# Patient Record
Sex: FEMALE | Race: BLACK OR AFRICAN AMERICAN | NOT HISPANIC OR LATINO | URBAN - METROPOLITAN AREA
[De-identification: names, ages, dates, MRNs, and addresses within clinical notes are randomized per-mention and may not be internally consistent; named-entity substitution may affect disease eponyms.]

---

## 2024-02-02 ENCOUNTER — HOSPITAL ENCOUNTER (EMERGENCY)
Facility: HOSPITAL | Age: 24
Discharge: HOME | End: 2024-02-02
Payer: COMMERCIAL

## 2024-02-02 ENCOUNTER — DOCUMENTATION (OUTPATIENT)
Dept: EMERGENCY MEDICINE | Facility: HOSPITAL | Age: 24
End: 2024-02-02
Payer: COMMERCIAL

## 2024-02-02 VITALS
WEIGHT: 220.02 LBS | HEIGHT: 66 IN | TEMPERATURE: 98.1 F | OXYGEN SATURATION: 97 % | DIASTOLIC BLOOD PRESSURE: 79 MMHG | BODY MASS INDEX: 35.36 KG/M2 | HEART RATE: 99 BPM | SYSTOLIC BLOOD PRESSURE: 128 MMHG | RESPIRATION RATE: 20 BRPM

## 2024-02-02 DIAGNOSIS — N89.8 VAGINAL DISCHARGE: Primary | ICD-10-CM

## 2024-02-02 LAB
APPEARANCE UR: CLEAR
BILIRUB UR STRIP.AUTO-MCNC: NEGATIVE MG/DL
C TRACH RRNA SPEC QL NAA+PROBE: NEGATIVE
CLUE CELLS SPEC QL WET PREP: NORMAL
COLOR UR: COLORLESS
GLUCOSE UR STRIP.AUTO-MCNC: NEGATIVE MG/DL
HCV AB SER QL: NONREACTIVE
HIV 1+2 AB+HIV1 P24 AG SERPL QL IA: NONREACTIVE
HOLD SPECIMEN: NORMAL
KETONES UR STRIP.AUTO-MCNC: NEGATIVE MG/DL
LEUKOCYTE ESTERASE UR QL STRIP.AUTO: NEGATIVE
N GONORRHOEA DNA SPEC QL PROBE+SIG AMP: NEGATIVE
NITRITE UR QL STRIP.AUTO: NEGATIVE
PH UR STRIP.AUTO: 6 [PH]
PREGNANCY TEST URINE, POC: NEGATIVE
PROT UR STRIP.AUTO-MCNC: NEGATIVE MG/DL
RBC # UR STRIP.AUTO: NEGATIVE /UL
SP GR UR STRIP.AUTO: 1
T VAGINALIS SPEC QL WET PREP: NORMAL
TREPONEMA PALLIDUM IGG+IGM AB [PRESENCE] IN SERUM OR PLASMA BY IMMUNOASSAY: NONREACTIVE
UROBILINOGEN UR STRIP.AUTO-MCNC: <2 MG/DL
WBC VAG QL WET PREP: NORMAL
YEAST VAG QL WET PREP: NORMAL

## 2024-02-02 PROCEDURE — 99284 EMERGENCY DEPT VISIT MOD MDM: CPT

## 2024-02-02 PROCEDURE — 99283 EMERGENCY DEPT VISIT LOW MDM: CPT

## 2024-02-02 PROCEDURE — 81003 URINALYSIS AUTO W/O SCOPE: CPT | Performed by: PHYSICIAN ASSISTANT

## 2024-02-02 PROCEDURE — 81025 URINE PREGNANCY TEST: CPT | Performed by: PHYSICIAN ASSISTANT

## 2024-02-02 PROCEDURE — 86780 TREPONEMA PALLIDUM: CPT | Performed by: PHYSICIAN ASSISTANT

## 2024-02-02 PROCEDURE — 87210 SMEAR WET MOUNT SALINE/INK: CPT | Performed by: PHYSICIAN ASSISTANT

## 2024-02-02 PROCEDURE — 86803 HEPATITIS C AB TEST: CPT | Performed by: PHYSICIAN ASSISTANT

## 2024-02-02 PROCEDURE — 87389 HIV-1 AG W/HIV-1&-2 AB AG IA: CPT | Performed by: PHYSICIAN ASSISTANT

## 2024-02-02 PROCEDURE — 87800 DETECT AGNT MULT DNA DIREC: CPT | Performed by: PHYSICIAN ASSISTANT

## 2024-02-02 PROCEDURE — 36415 COLL VENOUS BLD VENIPUNCTURE: CPT | Performed by: PHYSICIAN ASSISTANT

## 2024-02-02 PROCEDURE — 99284 EMERGENCY DEPT VISIT MOD MDM: CPT | Performed by: PHYSICIAN ASSISTANT

## 2024-02-02 RX ORDER — BUSPIRONE HYDROCHLORIDE 5 MG/1
1 TABLET ORAL 3 TIMES DAILY
COMMUNITY
Start: 2024-01-24 | End: 2024-02-23

## 2024-02-02 RX ORDER — OLANZAPINE 10 MG/1
TABLET ORAL
COMMUNITY
Start: 2024-01-24 | End: 2024-02-23

## 2024-02-02 RX ORDER — HYDROXYZINE PAMOATE 25 MG/1
CAPSULE ORAL 3 TIMES DAILY PRN
COMMUNITY
Start: 2024-01-24 | End: 2024-02-23

## 2024-02-02 ASSESSMENT — LIFESTYLE VARIABLES
EVER HAD A DRINK FIRST THING IN THE MORNING TO STEADY YOUR NERVES TO GET RID OF A HANGOVER: NO
EVER FELT BAD OR GUILTY ABOUT YOUR DRINKING: NO
HAVE PEOPLE ANNOYED YOU BY CRITICIZING YOUR DRINKING: NO
HAVE YOU EVER FELT YOU SHOULD CUT DOWN ON YOUR DRINKING: NO

## 2024-02-02 ASSESSMENT — PAIN DESCRIPTION - PROGRESSION: CLINICAL_PROGRESSION: NOT CHANGED

## 2024-02-02 ASSESSMENT — COLUMBIA-SUICIDE SEVERITY RATING SCALE - C-SSRS
2. HAVE YOU ACTUALLY HAD ANY THOUGHTS OF KILLING YOURSELF?: NO
1. IN THE PAST MONTH, HAVE YOU WISHED YOU WERE DEAD OR WISHED YOU COULD GO TO SLEEP AND NOT WAKE UP?: NO
6. HAVE YOU EVER DONE ANYTHING, STARTED TO DO ANYTHING, OR PREPARED TO DO ANYTHING TO END YOUR LIFE?: NO

## 2024-02-02 ASSESSMENT — PAIN - FUNCTIONAL ASSESSMENT: PAIN_FUNCTIONAL_ASSESSMENT: 0-10

## 2024-02-02 NOTE — ED TRIAGE NOTES
Pt  just got out of senior care in end of December and started noticing white creamy discharge and an odor (fishy).  was treated at  for yeast infection but pills did not help.

## 2024-02-02 NOTE — ED PROVIDER NOTES
HPI   Chief Complaint   Patient presents with    Exposure to STD       Patient is a 23-year-old female who is previously healthy who just got out of [facility] about 4 weeks ago and for the last 3 weeks has been having vaginal discharge, she states it is thick white and creamy and has a slight smell to it.  Patient states urgent care tested her a couple days ago but they never called her with her results.  She states they prescribed her Diflucan which did not help.  Patient believes that may be BV, she is agreeable to getting all tested for STDs including blood testing for HIV syphilis and hepatitis C.  Denies any dyspareunia, she states her pregnancy test at urgent care couple days ago that was negative.  No abdominal pain or flank pain.  She does endorse urinary frequency without burning or urgency.                          [place] Coma Scale Score: 15                  Patient History   Past Medical History:   Diagnosis Date    Bipolar 1 disorder (CMS/HCC)     OCD (obsessive compulsive disorder)     Schizoaffective disorder (CMS/HCC)      Past Surgical History:   Procedure Laterality Date    OTHER SURGICAL HISTORY  10/07/2016    Prior Surgical Procedure Not Done     No family history on file.  Social History     Tobacco Use    Smoking status: Every Day     Types: Cigarettes    Smokeless tobacco: Never   Substance Use Topics    Alcohol use: Not Currently    Drug use: Not Currently       Physical Exam   ED Triage Vitals [02/02/24 0833]   Temperature Heart Rate Respirations BP   36.7 °C (98.1 °F) 99 20 128/79      Pulse Ox Temp Source Heart Rate Source Patient Position   97 % Oral Monitor Sitting      BP Location FiO2 (%)     -- 21 %       Physical Exam  Vitals and nursing note reviewed.   Constitutional:       General: She is not in acute distress.     Appearance: Normal appearance. She is not toxic-appearing.   HENT:      Head: Normocephalic and atraumatic.      Nose: Nose normal.   Eyes:      Extraocular Movements:  Extraocular movements intact.   Cardiovascular:      Rate and Rhythm: Normal rate and regular rhythm.   Pulmonary:      Effort: Pulmonary effort is normal.   Abdominal:      Palpations: Abdomen is soft.   Genitourinary:     Comments: Minimal white discharge adherent to vaginal walls, not copious, no pain.  Chaperoned by Bee Schuster, female PA.  Musculoskeletal:         General: Normal range of motion.      Cervical back: Normal range of motion and neck supple.   Skin:     General: Skin is warm and dry.   Neurological:      General: No focal deficit present.      Mental Status: She is alert.   Psychiatric:         Mood and Affect: Mood normal.         Thought Content: Thought content normal.       No orders to display     Labs Reviewed   URINALYSIS WITH REFLEX CULTURE AND MICROSCOPIC - Abnormal       Result Value    Color, Urine Colorless (*)     Appearance, Urine Clear      Specific Gravity, Urine 1.002 (*)     pH, Urine 6.0      Protein, Urine NEGATIVE      Glucose, Urine NEGATIVE      Blood, Urine NEGATIVE      Ketones, Urine NEGATIVE      Bilirubin, Urine NEGATIVE      Urobilinogen, Urine <2.0      Nitrite, Urine NEGATIVE      Leukocyte Esterase, Urine NEGATIVE     POCT PREGNANCY, URINE - Normal    Preg Test, Ur Negative     WET PREP, GENITAL    Trichomonas None Seen      Clue Cells None Seen      Yeast None Seen      WBC 3-8     C. TRACHOMATIS + N. GONORRHOEAE, AMPLIFIED   HIV 1/2 ANTIGEN/ANTIBODY SCREEN [facility] REFLEX TO CONFIRMATION   SYPHILIS SCREENING WITH REFLEX   HEPATITIS C ANTIBODY   URINALYSIS WITH REFLEX CULTURE AND MICROSCOPIC    Narrative:     The following orders were created for panel order Urinalysis with Reflex Culture and Microscopic.  Procedure                               Abnormality         Status                     ---------                               -----------         ------                     Urinalysis with Reflex Cu...[[id number]]  Abnormal            Final result                Extra Urine Gray Tube[[id number]]                             In process                   Please view results for these tests on the individual orders.   EXTRA URINE GRAY TUBE         ED Course & MDM   Diagnoses as of 02/02/24 1018   Vaginal discharge       Medical Decision Making      MDM: Patient is a 23-year-old female who presents today for evaluation of STD exposure, see above HPI and physical exam. Patient tested for STDs, urinalysis and pregnancy test were obtained, pregnancy negative. Patient's wet prep is negative, urinalysis and pregnancy test negative.  Discussed with patient that this could be a pH disturbance, she does endorse that she just recently started using scented tampons as well as a vaginal douche.  She is encouraged to discontinue this, we had a long discussion regarding normal vaginal pH and how to maintain this, think stroke avoid to prevent any disturbances.  She is aware that the blood testing gonorrhea and chlamydia are still pending, it is somebody will notify her of her results if positive.  Patient expressed agreement understanding stable for discharge at this time.        Procedure  Procedures     Miladys Huber PA-C  02/02/24 1018

## 2024-05-09 ENCOUNTER — APPOINTMENT (OUTPATIENT)
Dept: CARDIOLOGY | Facility: HOSPITAL | Age: 24
End: 2024-05-09
Payer: COMMERCIAL

## 2024-05-09 ENCOUNTER — HOSPITAL ENCOUNTER (EMERGENCY)
Facility: HOSPITAL | Age: 24
Discharge: HOME | End: 2024-05-10
Attending: STUDENT IN AN ORGANIZED HEALTH CARE EDUCATION/TRAINING PROGRAM
Payer: COMMERCIAL

## 2024-05-09 DIAGNOSIS — Z00.8 ENCOUNTER FOR PSYCHOLOGICAL EVALUATION: Primary | ICD-10-CM

## 2024-05-09 DIAGNOSIS — F19.10 SUBSTANCE ABUSE (MULTI): ICD-10-CM

## 2024-05-09 LAB
ALBUMIN SERPL-MCNC: 3.5 G/DL (ref 3.5–5)
ALP BLD-CCNC: 64 U/L (ref 35–125)
ALT SERPL-CCNC: 8 U/L (ref 5–40)
ANION GAP SERPL CALC-SCNC: 9 MMOL/L
APAP SERPL-MCNC: <5 UG/ML
AST SERPL-CCNC: 11 U/L (ref 5–40)
BASOPHILS # BLD AUTO: 0.02 X10*3/UL (ref 0–0.1)
BASOPHILS NFR BLD AUTO: 0.3 %
BILIRUB SERPL-MCNC: 0.2 MG/DL (ref 0.1–1.2)
BUN SERPL-MCNC: 19 MG/DL (ref 8–25)
CALCIUM SERPL-MCNC: 8.3 MG/DL (ref 8.5–10.4)
CHLORIDE SERPL-SCNC: 109 MMOL/L (ref 97–107)
CO2 SERPL-SCNC: 24 MMOL/L (ref 24–31)
CREAT SERPL-MCNC: 0.8 MG/DL (ref 0.4–1.6)
EGFRCR SERPLBLD CKD-EPI 2021: >90 ML/MIN/1.73M*2
EOSINOPHIL # BLD AUTO: 0.19 X10*3/UL (ref 0–0.7)
EOSINOPHIL NFR BLD AUTO: 2.5 %
ERYTHROCYTE [DISTWIDTH] IN BLOOD BY AUTOMATED COUNT: 12.8 % (ref 11.5–14.5)
ETHANOL SERPL-MCNC: <0.01 G/DL
GLUCOSE SERPL-MCNC: 84 MG/DL (ref 65–99)
HCG SERPL-ACNC: <1 MIU/ML
HCT VFR BLD AUTO: 35.7 % (ref 36–46)
HGB BLD-MCNC: 12 G/DL (ref 12–16)
IMM GRANULOCYTES # BLD AUTO: 0.03 X10*3/UL (ref 0–0.7)
IMM GRANULOCYTES NFR BLD AUTO: 0.4 % (ref 0–0.9)
LYMPHOCYTES # BLD AUTO: 2.84 X10*3/UL (ref 1.2–4.8)
LYMPHOCYTES NFR BLD AUTO: 36.7 %
MCH RBC QN AUTO: 30.4 PG (ref 26–34)
MCHC RBC AUTO-ENTMCNC: 33.6 G/DL (ref 32–36)
MCV RBC AUTO: 90 FL (ref 80–100)
MONOCYTES # BLD AUTO: 0.45 X10*3/UL (ref 0.1–1)
MONOCYTES NFR BLD AUTO: 5.8 %
NEUTROPHILS # BLD AUTO: 4.2 X10*3/UL (ref 1.2–7.7)
NEUTROPHILS NFR BLD AUTO: 54.3 %
NRBC BLD-RTO: 0 /100 WBCS (ref 0–0)
PLATELET # BLD AUTO: 312 X10*3/UL (ref 150–450)
POTASSIUM SERPL-SCNC: 3.7 MMOL/L (ref 3.4–5.1)
PROT SERPL-MCNC: 6.3 G/DL (ref 5.9–7.9)
RBC # BLD AUTO: 3.95 X10*6/UL (ref 4–5.2)
SALICYLATES SERPL-MCNC: <0 MG/DL
SODIUM SERPL-SCNC: 142 MMOL/L (ref 133–145)
WBC # BLD AUTO: 7.7 X10*3/UL (ref 4.4–11.3)

## 2024-05-09 PROCEDURE — 85025 COMPLETE CBC W/AUTO DIFF WBC: CPT | Performed by: STUDENT IN AN ORGANIZED HEALTH CARE EDUCATION/TRAINING PROGRAM

## 2024-05-09 PROCEDURE — 99284 EMERGENCY DEPT VISIT MOD MDM: CPT | Mod: 25

## 2024-05-09 PROCEDURE — 84075 ASSAY ALKALINE PHOSPHATASE: CPT | Performed by: STUDENT IN AN ORGANIZED HEALTH CARE EDUCATION/TRAINING PROGRAM

## 2024-05-09 PROCEDURE — 36415 COLL VENOUS BLD VENIPUNCTURE: CPT | Performed by: STUDENT IN AN ORGANIZED HEALTH CARE EDUCATION/TRAINING PROGRAM

## 2024-05-09 PROCEDURE — 84702 CHORIONIC GONADOTROPIN TEST: CPT | Performed by: STUDENT IN AN ORGANIZED HEALTH CARE EDUCATION/TRAINING PROGRAM

## 2024-05-09 PROCEDURE — 93005 ELECTROCARDIOGRAM TRACING: CPT

## 2024-05-09 PROCEDURE — 96374 THER/PROPH/DIAG INJ IV PUSH: CPT

## 2024-05-09 PROCEDURE — 2500000004 HC RX 250 GENERAL PHARMACY W/ HCPCS (ALT 636 FOR OP/ED): Performed by: STUDENT IN AN ORGANIZED HEALTH CARE EDUCATION/TRAINING PROGRAM

## 2024-05-09 PROCEDURE — 80143 DRUG ASSAY ACETAMINOPHEN: CPT | Performed by: STUDENT IN AN ORGANIZED HEALTH CARE EDUCATION/TRAINING PROGRAM

## 2024-05-09 RX ORDER — NALOXONE HYDROCHLORIDE 0.4 MG/ML
0.4 INJECTION, SOLUTION INTRAMUSCULAR; INTRAVENOUS; SUBCUTANEOUS ONCE
Status: COMPLETED | OUTPATIENT
Start: 2024-05-09 | End: 2024-05-09

## 2024-05-09 RX ADMIN — NALOXONE HYDROCHLORIDE 0.4 MG: 0.4 INJECTION, SOLUTION INTRAMUSCULAR; INTRAVENOUS; SUBCUTANEOUS at 22:28

## 2024-05-09 SDOH — HEALTH STABILITY: MENTAL HEALTH: IN THE PAST FEW WEEKS, HAVE YOU FELT THAT YOU OR YOUR FAMILY WOULD BE BETTER OFF IF YOU WERE DEAD?: NO

## 2024-05-09 SDOH — HEALTH STABILITY: MENTAL HEALTH: IN THE PAST WEEK, HAVE YOU BEEN HAVING THOUGHTS ABOUT KILLING YOURSELF?: NO

## 2024-05-09 SDOH — HEALTH STABILITY: MENTAL HEALTH: SUICIDAL BEHAVIOR (LIFETIME): NO

## 2024-05-09 SDOH — HEALTH STABILITY: MENTAL HEALTH: ANXIETY SYMPTOMS: NO PROBLEMS REPORTED OR OBSERVED.

## 2024-05-09 SDOH — HEALTH STABILITY: MENTAL HEALTH: ARE YOU HAVING THOUGHTS OF KILLING YOURSELF RIGHT NOW?: NO

## 2024-05-09 SDOH — HEALTH STABILITY: MENTAL HEALTH: WISH TO BE DEAD (PAST 1 MONTH): NO

## 2024-05-09 SDOH — HEALTH STABILITY: MENTAL HEALTH: DEPRESSION SYMPTOMS: NO PROBLEMS REPORTED OR OBSERVED.

## 2024-05-09 SDOH — HEALTH STABILITY: MENTAL HEALTH: NON-SPECIFIC ACTIVE SUICIDAL THOUGHTS (PAST 1 MONTH): NO

## 2024-05-09 SDOH — HEALTH STABILITY: MENTAL HEALTH: IN THE PAST FEW WEEKS, HAVE YOU WISHED YOU WERE DEAD?: NO

## 2024-05-09 SDOH — HEALTH STABILITY: MENTAL HEALTH: HAVE YOU EVER TRIED TO KILL YOURSELF?: NO

## 2024-05-09 SDOH — ECONOMIC STABILITY: HOUSING INSECURITY: FEELS SAFE LIVING IN HOME: YES

## 2024-05-09 ASSESSMENT — COLUMBIA-SUICIDE SEVERITY RATING SCALE - C-SSRS
1. IN THE PAST MONTH, HAVE YOU WISHED YOU WERE DEAD OR WISHED YOU COULD GO TO SLEEP AND NOT WAKE UP?: NO
2. HAVE YOU ACTUALLY HAD ANY THOUGHTS OF KILLING YOURSELF?: NO

## 2024-05-09 ASSESSMENT — PAIN SCALES - GENERAL: PAINLEVEL_OUTOF10: 0 - NO PAIN

## 2024-05-09 ASSESSMENT — LIFESTYLE VARIABLES
SUBSTANCE_ABUSE_PAST_12_MONTHS: YES
PRESCIPTION_ABUSE_PAST_12_MONTHS: YES

## 2024-05-09 ASSESSMENT — PAIN - FUNCTIONAL ASSESSMENT: PAIN_FUNCTIONAL_ASSESSMENT: 0-10

## 2024-05-09 NOTE — Clinical Note
Leandra Taylor was seen and treated in our emergency department on 5/9/2024.  She may return to work on 05/10/2024.       If you have any questions or concerns, please don't hesitate to call.      Zachary Chambers, DO

## 2024-05-10 ENCOUNTER — DOCUMENTATION (OUTPATIENT)
Dept: SOCIAL WORK | Age: 24
End: 2024-05-10
Payer: COMMERCIAL

## 2024-05-10 VITALS
HEIGHT: 65 IN | HEART RATE: 82 BPM | RESPIRATION RATE: 16 BRPM | SYSTOLIC BLOOD PRESSURE: 125 MMHG | OXYGEN SATURATION: 100 % | BODY MASS INDEX: 36.65 KG/M2 | DIASTOLIC BLOOD PRESSURE: 77 MMHG | TEMPERATURE: 97.2 F | WEIGHT: 220 LBS

## 2024-05-10 LAB
AMPHETAMINES UR QL SCN>1000 NG/ML: NEGATIVE
APPEARANCE UR: CLEAR
ATRIAL RATE: 66 BPM
BARBITURATES UR QL SCN>300 NG/ML: NEGATIVE
BENZODIAZ UR QL SCN>300 NG/ML: NEGATIVE
BILIRUB UR STRIP.AUTO-MCNC: NEGATIVE MG/DL
BZE UR QL SCN>300 NG/ML: NEGATIVE
CANNABINOIDS UR QL SCN>50 NG/ML: POSITIVE
COLOR UR: ABNORMAL
FENTANYL+NORFENTANYL UR QL SCN: NEGATIVE
GLUCOSE UR STRIP.AUTO-MCNC: NORMAL MG/DL
HOLD SPECIMEN: NORMAL
KETONES UR STRIP.AUTO-MCNC: NEGATIVE MG/DL
LEUKOCYTE ESTERASE UR QL STRIP.AUTO: ABNORMAL
METHADONE UR QL SCN>300 NG/ML: NEGATIVE
NITRITE UR QL STRIP.AUTO: NEGATIVE
OPIATES UR QL SCN>300 NG/ML: NEGATIVE
OXYCODONE UR QL: NEGATIVE
P AXIS: 29 DEGREES
P OFFSET: 193 MS
P ONSET: 145 MS
PCP UR QL SCN>25 NG/ML: NEGATIVE
PH UR STRIP.AUTO: 6 [PH]
PR INTERVAL: 142 MS
PROT UR STRIP.AUTO-MCNC: NEGATIVE MG/DL
Q ONSET: 216 MS
QRS COUNT: 11 BEATS
QRS DURATION: 82 MS
QT INTERVAL: 410 MS
QTC CALCULATION(BAZETT): 429 MS
QTC FREDERICIA: 423 MS
R AXIS: 58 DEGREES
RBC # UR STRIP.AUTO: NEGATIVE /UL
RBC #/AREA URNS AUTO: ABNORMAL /HPF
SP GR UR STRIP.AUTO: 1.03
SQUAMOUS #/AREA URNS AUTO: ABNORMAL /HPF
T AXIS: -17 DEGREES
T OFFSET: 421 MS
UROBILINOGEN UR STRIP.AUTO-MCNC: NORMAL MG/DL
VENTRICULAR RATE: 66 BPM
WBC #/AREA URNS AUTO: ABNORMAL /HPF

## 2024-05-10 PROCEDURE — 80307 DRUG TEST PRSMV CHEM ANLYZR: CPT | Performed by: STUDENT IN AN ORGANIZED HEALTH CARE EDUCATION/TRAINING PROGRAM

## 2024-05-10 PROCEDURE — 81001 URINALYSIS AUTO W/SCOPE: CPT | Mod: 59 | Performed by: STUDENT IN AN ORGANIZED HEALTH CARE EDUCATION/TRAINING PROGRAM

## 2024-05-10 PROCEDURE — 87086 URINE CULTURE/COLONY COUNT: CPT | Mod: WESLAB | Performed by: STUDENT IN AN ORGANIZED HEALTH CARE EDUCATION/TRAINING PROGRAM
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
## 2024-05-10 NOTE — DISCHARGE INSTRUCTIONS
Follow-up with your primary care physician within 1 to 2 days for further management of your current symptoms.      Use the resources provided to you by the crisis team to obtain outpatient substance abuse and mental health care counseling      Return to the emergency department sooner with worsening of symptoms or onset of new symptoms

## 2024-05-10 NOTE — PROGRESS NOTES
Pt care accepted from Dr Kohler at 0600 with the medically cleared and the crisis team attempting placement for inpatient detox at the request of the patient.      The patient is a 23-year-old female presenting to the emergency department to request inpatient placement for detox.  The patient admits to polysubstance abuse.  She reportedly went to [facility] yesterday to request placement for inpatient detox but was under the influence and somnolent so they would not admit her at that time.  They sent her to the emergency room for medical clearance prior to considering accepting her.  The patient does have a history of schizoaffective disorder but does not have any hallucinations.  No homicidal or suicidal ideation.  No hallucinations.  No headache or visual changes.  No chest pain or shortness of breath.  No abdominal pain.  No nausea vomiting.  No diarrhea or constipation.  No vaginal discharge.  No urinary complaints.  No fever or chills.  All pertinent positives and negatives are recorded above.  All other systems reviewed and otherwise negative.  Vital signs within normal limits.  Physical exam with a well-nourished well-developed female in no acute distress.  HEENT exam within normal limits.  She has no evidence of airway compromise or respiratory distress.  Abdominal exam is benign.  She has no gross motor, neurologic or vascular deficits on exam.      EKG with sinus rhythm at 66 bpm, normal axis, normal voltage, normal ST segment, normal T waves      Diagnostic labs with evidence of substance abuse, mild electrolyte imbalance and a contaminated urine specimen but no obvious urinary tract infection      The crisis team was consulted and evaluated the patient the emergency room.  They were not successful in trying to place the patient for inpatient detox and felt that the patient did not meet criteria for inpatient mental health care at this time.  They did provide the patient with resources for  outpatient programs.      The patient was released in good condition.  She will follow-up with her primary care physician within 1 to 2 days for further management of her current symptoms.  She will use the resources provided to her by the crisis team to obtain outpatient substance abuse counseling.  She will return to the emergency department sooner with worsening of symptoms or onset of new symptoms      Impression/diagnosis  Substance abuse      I independently reviewed the results of the EKG and diagnostic labs        Jahaira Her MD
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
## 2024-05-10 NOTE — ED PROVIDER NOTES
"HPI   Chief Complaint   Patient presents with    Drug / Alcohol Assessment     [facility] sent her for medical clearance alcohol,THC, X, lisa and cocaine last used before coming       Patient is a 23-year-old female presenting to the emergency department for psychiatric evaluation and medical clearance for [facility].  Patient reportedly went to [facility] to \"better herself\".  She admits to using many different drugs including cocaine, cannabis, ecstasy, and Lisa.  She states she took 2 THC Gummies today prior to arrival.  When she went to [facility] she was going in and out of consciousness and seemed very drowsy and therefore was sent to the emergency department for further medical clearance.  She denies any suicidal or homicidal ideations.  She denies chest pain, shortness of breath, fever, chills, nausea, vomiting, abdominal pain.                          No data recorded                   Patient History   Past Medical History:   Diagnosis Date    Bipolar 1 disorder (Multi)     OCD (obsessive compulsive disorder)     Schizoaffective disorder (Multi)      Past Surgical History:   Procedure Laterality Date    OTHER SURGICAL HISTORY  10/07/2016    Prior Surgical Procedure Not Done     No family history on file.  Social History     Tobacco Use    Smoking status: Every Day     Types: Cigarettes    Smokeless tobacco: Never   Substance Use Topics    Alcohol use: Not Currently    Drug use: Not Currently       Physical Exam   ED Triage Vitals [05/09/24 2115]   Temperature Heart Rate Respirations BP   36.2 °C (97.2 °F) 80 17 88/53      Pulse Ox Temp Source Heart Rate Source Patient Position   97 % Oral Monitor --      BP Location FiO2 (%)     -- --       Physical Exam  Vitals and nursing note reviewed.   Constitutional:       General: She is not in acute distress.     Appearance: Normal appearance. She is not ill-appearing or toxic-appearing.   HENT:      Head: Normocephalic and atraumatic.      Nose: " Nose normal.      Mouth/Throat:      Mouth: Mucous membranes are moist.   Eyes:      Pupils: Pupils are equal, round, and reactive to light.      Comments: Bilateral conjunctival injection   Cardiovascular:      Rate and Rhythm: Normal rate and regular rhythm.      Pulses: Normal pulses.      Heart sounds: Normal heart sounds. No murmur heard.     No friction rub. No gallop.   Pulmonary:      Effort: Pulmonary effort is normal.      Breath sounds: Normal breath sounds. No wheezing, rhonchi or rales.   Abdominal:      Palpations: Abdomen is soft.      Tenderness: There is no abdominal tenderness.   Musculoskeletal:         General: Normal range of motion.      Cervical back: Normal range of motion.   Neurological:      General: No focal deficit present.      Mental Status: She is alert and oriented to person, place, and time.      Comments: Does appear slightly drowsy and under the influence of some substance.   Psychiatric:         Attention and Perception: Attention normal.         Mood and Affect: Mood is depressed.         Thought Content: Thought content does not include homicidal or suicidal ideation.         ED Course & [facility]   ED Course as of 05/10/24 0118   Thu May 09, 2024   2330 My EKG interpretation:  Sinus rhythm 66 bpm normal axis MT interval 142 QTc 429 no ectopy or acute ischemic changes noted [KW]      ED Course User Index  [KW] Eliu Kohler DO         Diagnoses as of 05/10/24 0118   Encounter for psychological evaluation       Medical Decision Making  **Disclaimer parts of this chart have been completed using voice recognition software. Please excuse any errors of transcription.     Patient seen in conjunction with attending physician .     HPI: Detailed above.    Exam: A medically appropriate exam performed, outlined above, given the known history and presentation.    History obtained from: Patient    EKG: Reviewed and interpreted by my attending physician    Labs/Diagnostics:  Labs  Reviewed   CBC WITH AUTO DIFFERENTIAL - Abnormal       Result Value    WBC 7.7      nRBC 0.0      RBC 3.95 (*)     Hemoglobin 12.0      Hematocrit 35.7 (*)     MCV 90      MCH 30.4      MCHC 33.6      RDW 12.8      Platelets 312      Neutrophils % 54.3      Immature Granulocytes %, Automated 0.4      Lymphocytes % 36.7      Monocytes % 5.8      Eosinophils % 2.5      Basophils % 0.3      Neutrophils Absolute 4.20      Immature Granulocytes Absolute, Automated 0.03      Lymphocytes Absolute 2.84      Monocytes Absolute 0.45      Eosinophils Absolute 0.19      Basophils Absolute 0.02     COMPREHENSIVE METABOLIC PANEL - Abnormal    Glucose 84      Sodium 142      Potassium 3.7      Chloride 109 (*)     Bicarbonate 24      Urea Nitrogen 19      Creatinine 0.80      eGFR >90      Calcium 8.3 (*)     Albumin 3.5      Alkaline Phosphatase 64      Total Protein 6.3      AST 11      Bilirubin, Total 0.2      ALT 8      Anion Gap 9     ACUTE TOXICOLOGY PANEL, BLOOD - Normal    Acetaminophen <5.0      Salicylate  <0      Alcohol <0.010     HUMAN CHORIONIC GONADOTROPIN, SERUM QUANTITATIVE    HCG, Beta-Quantitative <1     URINALYSIS WITH REFLEX CULTURE AND MICROSCOPIC    Narrative:     The following orders were created for panel order Urinalysis with Reflex Culture and Microscopic.  Procedure                               Abnormality         Status                     ---------                               -----------         ------                     Urinalysis with Reflex C...[[id number]]                                                 Extra Urine Gray Tube[[id number]]                                                         Please view results for these tests on the individual orders.   DRUG SCREEN,URINE   URINALYSIS WITH REFLEX CULTURE AND MICROSCOPIC   EXTRA URINE GRAY TUBE     EMERGENCY DEPARTMENT COURSE and DIFFERENTIAL DIAGNOSIS/MDM:  Patient is a 23-year-old female presenting to the emergency department for psychiatric  "evaluation and detox.  On physical exam vital signs stable patient is in no acute distress.  She appears to be under the influence of a substance and states that she did take 2 THC Gummies prior to arrival.  She denies any suicidal or homicidal ideations at this time.  She was given Narcan to see if it would help her wake up a little bit.  Diagnostic labs ordered.  CMP showed a chloride of 107 and calcium of 8.3 but no significant abnormalities.  Toxicology panel normal.  Quantitative hCG negative.  CBC showed no leukocytosis or anemia.  Urine still pending at the time of my departure however I do feel patient could benefit from inpatient treatment for depression and substance abuse.  Despite not having a urine specimen back at this time do not feel there is any medical reason that would stop the patient from going into inpatient treatment.  It has reached the end of my shift and patient is still pending placement.  Continuation of care as well as final disposition will be determined by attending physician in the emergency department.  Handoff was given to .       Vitals:    Vitals:    05/09/24 2115 05/10/24 0029   BP: 88/53 125/77   BP Location:  Left arm   Patient Position:  Lying   Pulse: 80 82   Resp: 17 16   Temp: 36.2 °C (97.2 °F)    TempSrc: Oral    SpO2: 97% 100%   Weight: 99.8 kg (220 lb)    Height: 1.651 m (5' 5\")      History Limited by:    Intoxication    Independent history obtained from:    None      External records reviewed:    Outpatient Note previous note from counseling services on 4/23/2024  ASSESSMENT:                 Diagnosis: F25.0 Schizoaffective disorder, bipolar type (HCC-CMS)  (primary encounter diagnosis)  Leandra stated that she was \"doing good.\" She went on to relate some events since occurred since our last session, along with her associated thoughts and feelings. It was note that she rambled some as she did so. She was provided time, space, support, and validation as she " shared and emoted. She was then asked if she had an opportunity to address the challenge she had been issued during our last session. She related one instance of when she did so. We then examined and discussed the instance for her thoughts and feelings. She was then asked her thoughts surrounding whether to reduce, increase, or keep the same delay time. She opted to increase her delay time from 10 seconds to 20. We then moved on to her relationship with her mother, something she had admitted to be challenging at times. Building on how motivated she feels as well as her current positive state, she was given some education surrounding benefits to learning and practicing coping strategies, effective communication skills, conflict resolution skills, calming strategies, and recognizing emotional triggers and responses. We then began exploring possible coping and calming strategies. We then focused on one of her choosing, mindful breathing, and combining it with her delaying gratification. She was encouraged to practice this regularly and share with her mother (and others of her choosing) as to what she was doing. This was explained to her as a means of informing mom in a future conflict of what she was doing as well as to provide mom the opportunity to support Leandra.              Engaged client in learning Behavioral Analysis, Review of Thinking Errors and Cognitive Challenges, Emotional Regulation, and Mindfulness               Progress toward goal: No Change     PLAN:   Assigned homework/practice of the following:  Behavioral Analysis, Review of Thinking Errors and Cognitive Challenges, Emotional Regulation, Self-Compassion, Mindfulness, and Tolerating Distress.     Continue psychotherapy       Diagnostics interpreted by me:    None      Discussions with other clinicians:    Social Work Nusrat      Chronic conditions impacting care:    None      Social determinants of health affecting care:    None    Diagnostic  tests considered but not performed: None    ED Medications managed:    Medications   naloxone (Narcan) injection 0.4 mg (0.4 mg intravenous Given 5/9/24 8473)         Prescription drugs considered:    None      Procedure  Procedures     Jyoti Schmitz PA-C  05/10/24 0118

## 2024-05-10 NOTE — ED NOTES
Spoke with patients mother regarding discharge she will  patient by 8am     Minerva Pinto, DARIUS  05/10/24 0617

## 2024-05-10 NOTE — PROGRESS NOTES
Social Work Note    Patient was declined for detox admission due to not meeting criteria for inpatient detox. Pt also does not currently meet criteria for regular inpatient psych, as she declines any SI/HI/AVH, and does not appear to be a threat to herself or others at this time.

## 2024-05-11 LAB — BACTERIA UR CULT: NORMAL
